# Patient Record
Sex: MALE | Race: WHITE | NOT HISPANIC OR LATINO | URBAN - METROPOLITAN AREA
[De-identification: names, ages, dates, MRNs, and addresses within clinical notes are randomized per-mention and may not be internally consistent; named-entity substitution may affect disease eponyms.]

---

## 2022-07-09 ENCOUNTER — TELEPHONE ENCOUNTER (OUTPATIENT)
Dept: URBAN - METROPOLITAN AREA CLINIC 121 | Facility: CLINIC | Age: 47
End: 2022-07-09

## 2022-07-09 RX ORDER — DEXLANSOPRAZOLE 60 MG/1
CAPSULE, DELAYED RELEASE ORAL ONCE A DAY
Refills: 11 | OUTPATIENT
Start: 2011-06-02 | End: 2011-06-14

## 2022-07-10 ENCOUNTER — TELEPHONE ENCOUNTER (OUTPATIENT)
Dept: URBAN - METROPOLITAN AREA CLINIC 121 | Facility: CLINIC | Age: 47
End: 2022-07-10

## 2022-07-10 RX ORDER — PANTOPRAZOLE SODIUM 40 MG/1
TABLET, DELAYED RELEASE ORAL ONCE A DAY
Refills: 11 | Status: ACTIVE | COMMUNITY
Start: 2011-06-07

## 2022-07-10 RX ORDER — PANTOPRAZOLE SODIUM 40 MG/1
TAKE 1 TABLET BY MOUTH ONCE A DAY TABLET, DELAYED RELEASE ORAL
Refills: 0 | Status: ACTIVE | COMMUNITY
Start: 2012-07-09

## 2022-07-10 RX ORDER — FLUCONAZOLE 100 MG/1
TABLET ORAL ONCE A DAY
Refills: 0 | Status: ACTIVE | COMMUNITY
Start: 2011-06-02

## 2022-07-10 RX ORDER — OXYMETAZOLINE HCL 0.05 %
SPRAY, NON-AEROSOL (ML) NASAL
Refills: 0 | Status: ACTIVE | COMMUNITY
Start: 2011-05-24

## 2022-07-10 RX ORDER — OMEGA-3S/DHA/EPA/FISH OIL 980-1400MG
CAPSULE,DELAYED RELEASE (ENTERIC COATED) ORAL
Refills: 0 | Status: ACTIVE | COMMUNITY
Start: 2011-05-24

## 2022-07-10 RX ORDER — DEXLANSOPRAZOLE 60 MG/1
CAPSULE, DELAYED RELEASE ORAL ONCE A DAY
Refills: 0 | Status: ACTIVE | COMMUNITY
Start: 2011-06-02

## 2022-07-10 RX ORDER — RABEPRAZOLE SODIUM 20 MG/1
TABLET, DELAYED RELEASE ORAL ONCE A DAY
Refills: 0 | Status: ACTIVE | COMMUNITY
Start: 2011-07-08

## 2023-07-12 ENCOUNTER — OFFICE VISIT (OUTPATIENT)
Dept: URBAN - METROPOLITAN AREA CLINIC 68 | Facility: CLINIC | Age: 48
End: 2023-07-12
Payer: COMMERCIAL

## 2023-07-12 ENCOUNTER — WEB ENCOUNTER (OUTPATIENT)
Dept: URBAN - METROPOLITAN AREA CLINIC 68 | Facility: CLINIC | Age: 48
End: 2023-07-12

## 2023-07-12 VITALS
HEIGHT: 68 IN | DIASTOLIC BLOOD PRESSURE: 70 MMHG | BODY MASS INDEX: 23.04 KG/M2 | WEIGHT: 152 LBS | SYSTOLIC BLOOD PRESSURE: 120 MMHG

## 2023-07-12 DIAGNOSIS — K22.70 BARRETT'S ESOPHAGUS WITHOUT DYSPLASIA: ICD-10-CM

## 2023-07-12 DIAGNOSIS — K52.9 CHRONIC DIARRHEA: ICD-10-CM

## 2023-07-12 DIAGNOSIS — K51.30 ULCERATIVE RECTOSIGMOIDITIS WITHOUT COMPLICATION: ICD-10-CM

## 2023-07-12 DIAGNOSIS — R19.7 DIARRHEA, UNSPECIFIED TYPE: ICD-10-CM

## 2023-07-12 PROBLEM — 41364008: Status: ACTIVE | Noted: 2023-07-12

## 2023-07-12 PROCEDURE — 99205 OFFICE O/P NEW HI 60 MIN: CPT | Performed by: SPECIALIST

## 2023-07-12 RX ORDER — OMEPRAZOLE 20 MG/1
1 CAPSULE 30 MINUTES BEFORE MORNING MEAL CAPSULE, DELAYED RELEASE ORAL ONCE A DAY
Status: ACTIVE | COMMUNITY

## 2023-07-12 RX ORDER — INSULIN LISPRO-AABC 100 [IU]/ML
AS DIRECTED INJECTION, SOLUTION INTRAVENOUS; SUBCUTANEOUS
Status: ACTIVE | COMMUNITY

## 2023-07-12 RX ORDER — OMEPRAZOLE 20 MG/1: 1 CAPSULE 30 MINUTES BEFORE MORNING MEAL CAPSULE, DELAYED RELEASE ORAL ONCE A DAY

## 2023-07-12 RX ORDER — INFLIXIMAB 100 MG/10ML
AS DIRECTED INJECTION, POWDER, LYOPHILIZED, FOR SOLUTION INTRAVENOUS
Qty: 1 EACH | Refills: 3 | OUTPATIENT
Start: 2023-07-19 | End: 2024-07-13

## 2023-07-12 NOTE — HPI-TODAY'S VISIT:
This 47-year-old with history of ulcerative colitis since 2011.  He was treated by [facility] Dr. Naeem Woods.  In the past he was on oral agents zemposia  with partial relief however with his flares it was decided to start on biologic therapy infusion type and was chosen to start on Stelara.  Approximate 10 weeks ago he got Stelara dose which improved him somewhat but he quickly relapsed with multiple loose stools and blood in his stool. Dr. Naeem Antunez evaluated and recommended infliximab and prednisone therapy he is on prednisone therapy with plan to start Remicade.  Currently is on 30 mg a day prednisone improved and dropping to 20 mg a day.  Currently is not having any bleeding In the past has a history of Castro's esophagus noted with intestinal metaplasia early in 2011 and 14 most recently 2019 upper endoscopy revealed no definite Castro's esophagus. Last endoscopy January 2023 revealed definite colitis Melo score 3

## 2023-08-07 ENCOUNTER — LAB OUTSIDE AN ENCOUNTER (OUTPATIENT)
Dept: URBAN - METROPOLITAN AREA CLINIC 68 | Facility: CLINIC | Age: 48
End: 2023-08-07

## 2023-08-08 LAB
ABSOLUTE BASOPHILS: 68
ABSOLUTE EOSINOPHILS: 90
ABSOLUTE LYMPHOCYTES: 2670
ABSOLUTE MONOCYTES: 548
ABSOLUTE NEUTROPHILS: 4125
BASOPHILS: 0.9
EOSINOPHILS: 1.2
HEMATOCRIT: 42.5
HEMOGLOBIN: 14
LYMPHOCYTES: 35.6
MCH: 28.9
MCHC: 32.9
MCV: 87.6
MONOCYTES: 7.3
MPV: 10.3
NEUTROPHILS: 55
PLATELET COUNT: 343
RDW: 15.2
RED BLOOD CELL COUNT: 4.85
WHITE BLOOD CELL COUNT: 7.5

## 2023-08-10 ENCOUNTER — OFFICE VISIT (OUTPATIENT)
Dept: URBAN - METROPOLITAN AREA CLINIC 68 | Facility: CLINIC | Age: 48
End: 2023-08-10
Payer: COMMERCIAL

## 2023-08-10 ENCOUNTER — TELEPHONE ENCOUNTER (OUTPATIENT)
Dept: URBAN - METROPOLITAN AREA CLINIC 68 | Facility: CLINIC | Age: 48
End: 2023-08-10

## 2023-08-10 VITALS — HEIGHT: 68 IN

## 2023-08-10 DIAGNOSIS — K51.30 ULCERATIVE RECTOSIGMOIDITIS WITHOUT COMPLICATION: ICD-10-CM

## 2023-08-10 DIAGNOSIS — K22.70 BARRETT'S ESOPHAGUS WITHOUT DYSPLASIA: ICD-10-CM

## 2023-08-10 PROCEDURE — 99214 OFFICE O/P EST MOD 30 MIN: CPT | Performed by: SPECIALIST

## 2023-08-10 RX ORDER — INFLIXIMAB 100 MG/10ML
AS DIRECTED INJECTION, POWDER, LYOPHILIZED, FOR SOLUTION INTRAVENOUS
Qty: 1 EACH | Refills: 3
Start: 2023-07-19 | End: 2024-08-04

## 2023-08-10 RX ORDER — PREDNISONE 5 MG/1
3 TABLETS TABLET ORAL ONCE A DAY
OUTPATIENT

## 2023-08-10 RX ORDER — INSULIN LISPRO-AABC 100 [IU]/ML
AS DIRECTED INJECTION, SOLUTION INTRAVENOUS; SUBCUTANEOUS
Status: ACTIVE | COMMUNITY

## 2023-08-10 RX ORDER — OMEPRAZOLE 20 MG/1
1 CAPSULE 30 MINUTES BEFORE MORNING MEAL CAPSULE, DELAYED RELEASE ORAL ONCE A DAY
Status: ACTIVE | COMMUNITY

## 2023-08-10 RX ORDER — INFLIXIMAB 100 MG/10ML
AS DIRECTED INJECTION, POWDER, LYOPHILIZED, FOR SOLUTION INTRAVENOUS
Qty: 1 EACH | Refills: 3
Start: 2023-07-19 | End: 2024-08-05

## 2023-08-10 RX ORDER — INFLIXIMAB 100 MG/10ML
AS DIRECTED INJECTION, POWDER, LYOPHILIZED, FOR SOLUTION INTRAVENOUS
Qty: 1 EACH | Refills: 3 | Status: ON HOLD | COMMUNITY
Start: 2023-07-19 | End: 2024-07-13

## 2023-08-10 RX ORDER — PREDNISONE 5 MG/1
3 TABLETS TABLET ORAL ONCE A DAY
Status: ACTIVE | COMMUNITY

## 2023-08-10 NOTE — HPI-TODAY'S VISIT:
Patient is doing well without any symptoms of colitis bleeding or urgency on a dose of prednisone 5 mg a day. His follow-up eosinophil levels have normalized. His complex history includes a upper respiratory-like infection symptoms after Stelara infusion for induction In the past the patient has had an apparent allergic reaction to mesalamine with development of hives and eosinophilia as well as a reaction to symposia therapy Patient is cleared for Remicade therapy as recommended by Dr. Galdamez of [facility] however he is hesitant given his reaction to previous biologic therapy  Impression Ulcerative colitis in clinical remission on steroids Eosinophilia resolved Pending allergy consultation  Recommendation Wean off prednisone in the next 2 to 4 weeks Start Remicade therapy in view of his recurrent colitis and recurrent need for prednisone in the past as well as his active pattern on his last colonoscopy and elevated calprotectin level Pgt ois reluctant and wants to wean off steroids again Avoid mesalamine apparent allergic reaction  await allergy consultation recommendations could be trialed again  Detailed discussion of risks and benefits of above plan with the patient he is concerned and will defer starting biologic until further discussion with Dr. Galdamez including the option of Entyvio therapy as opposed to Remicade awaiting professional recommendation

## 2023-08-11 PROBLEM — 302914006: Status: ACTIVE | Noted: 2023-07-19

## 2023-08-14 ENCOUNTER — TELEPHONE ENCOUNTER (OUTPATIENT)
Dept: URBAN - METROPOLITAN AREA CLINIC 68 | Facility: CLINIC | Age: 48
End: 2023-08-14

## 2023-08-14 RX ORDER — OMEPRAZOLE 20 MG/1
1 CAPSULE 30 MINUTES BEFORE MORNING MEAL CAPSULE, DELAYED RELEASE ORAL ONCE A DAY
Status: ACTIVE | COMMUNITY

## 2023-08-14 RX ORDER — PREDNISONE 5 MG/1
3 TABLETS TABLET ORAL ONCE A DAY
Status: ACTIVE | COMMUNITY

## 2023-08-14 RX ORDER — INSULIN LISPRO-AABC 100 [IU]/ML
AS DIRECTED INJECTION, SOLUTION INTRAVENOUS; SUBCUTANEOUS
Status: ACTIVE | COMMUNITY

## 2023-08-14 RX ORDER — INFLIXIMAB 100 MG/10ML
AS DIRECTED INJECTION, POWDER, LYOPHILIZED, FOR SOLUTION INTRAVENOUS
Qty: 1 EACH | Refills: 3 | Status: ACTIVE | COMMUNITY
Start: 2023-07-19 | End: 2024-08-04

## 2023-09-21 ENCOUNTER — TELEPHONE ENCOUNTER (OUTPATIENT)
Dept: URBAN - METROPOLITAN AREA CLINIC 68 | Facility: CLINIC | Age: 48
End: 2023-09-21

## 2023-09-27 ENCOUNTER — OFFICE VISIT (OUTPATIENT)
Dept: URBAN - METROPOLITAN AREA TELEHEALTH 1 | Facility: TELEHEALTH | Age: 48
End: 2023-09-27
Payer: COMMERCIAL

## 2023-09-27 DIAGNOSIS — K51.30 ULCERATIVE RECTOSIGMOIDITIS WITHOUT COMPLICATION: ICD-10-CM

## 2023-09-27 PROCEDURE — 99443 PHONE E/M BY PHYS 21-30 MIN: CPT | Performed by: SPECIALIST

## 2023-09-27 RX ORDER — PREDNISONE 5 MG/1
3 TABLETS TABLET ORAL ONCE A DAY
Status: ACTIVE | COMMUNITY

## 2023-09-27 RX ORDER — OMEPRAZOLE 20 MG/1
1 CAPSULE 30 MINUTES BEFORE MORNING MEAL CAPSULE, DELAYED RELEASE ORAL ONCE A DAY
Status: ACTIVE | COMMUNITY

## 2023-09-27 RX ORDER — PREDNISONE 5 MG/1
4 TABLTETS TABLET ORAL ONCE A DAY
Qty: 120 TABLET | Refills: 3

## 2023-09-27 RX ORDER — INSULIN LISPRO-AABC 100 [IU]/ML
AS DIRECTED INJECTION, SOLUTION INTRAVENOUS; SUBCUTANEOUS
Status: ACTIVE | COMMUNITY

## 2023-09-27 RX ORDER — INFLIXIMAB 100 MG/10ML
AS DIRECTED INJECTION, POWDER, LYOPHILIZED, FOR SOLUTION INTRAVENOUS
Qty: 1 EACH | Refills: 3 | Status: ACTIVE | COMMUNITY
Start: 2023-07-19 | End: 2024-08-04

## 2023-09-27 NOTE — HPI-TODAY'S VISIT:
TELEMED  Patient has symptoms of diarrhea and rectal bleeding. He's running with history inflammatory bowel disease. Inflection of what is denied by insurance company however bio equipment was approved. He's had arrangements to start therapy within one week at [facility]. He is currently taking prednisone 10 mg a day with worsening symptoms on the lower dose He'll continue prednisone until then I'm on the way IMPRESSION Active ulcerative colitis Pending restart biologic therapy with infliximab bioequivalent Somewhat controlled on prednisone will need higher dose PLAN Increase prednisone 20 mg daily start infliximab therapy

## 2023-10-26 ENCOUNTER — TELEPHONE ENCOUNTER (OUTPATIENT)
Dept: URBAN - METROPOLITAN AREA CLINIC 68 | Facility: CLINIC | Age: 48
End: 2023-10-26

## 2023-10-31 ENCOUNTER — TELEPHONE ENCOUNTER (OUTPATIENT)
Dept: URBAN - METROPOLITAN AREA CLINIC 68 | Facility: CLINIC | Age: 48
End: 2023-10-31

## 2023-10-31 ENCOUNTER — OFFICE VISIT (OUTPATIENT)
Dept: URBAN - METROPOLITAN AREA TELEHEALTH 1 | Facility: TELEHEALTH | Age: 48
End: 2023-10-31
Payer: COMMERCIAL

## 2023-10-31 VITALS — HEIGHT: 68 IN

## 2023-10-31 DIAGNOSIS — K22.70 BARRETT'S ESOPHAGUS WITHOUT DYSPLASIA: ICD-10-CM

## 2023-10-31 DIAGNOSIS — K51.30 ULCERATIVE RECTOSIGMOIDITIS WITHOUT COMPLICATION: ICD-10-CM

## 2023-10-31 PROCEDURE — 99443 PHONE E/M BY PHYS 21-30 MIN: CPT | Performed by: SPECIALIST

## 2023-10-31 RX ORDER — INSULIN LISPRO-AABC 100 [IU]/ML
AS DIRECTED INJECTION, SOLUTION INTRAVENOUS; SUBCUTANEOUS
Status: ACTIVE | COMMUNITY

## 2023-10-31 RX ORDER — INFLIXIMAB 100 MG/10ML
AS DIRECTED INJECTION, POWDER, LYOPHILIZED, FOR SOLUTION INTRAVENOUS
Qty: 1 EACH | Refills: 3 | Status: ACTIVE | COMMUNITY
Start: 2023-07-19 | End: 2024-08-04

## 2023-10-31 RX ORDER — PREDNISONE 5 MG/1
4 TABLTETS TABLET ORAL ONCE A DAY
Qty: 120 TABLET | Refills: 3 | Status: ACTIVE | COMMUNITY

## 2023-10-31 RX ORDER — PREDNISONE 5 MG/1
2 TABLETS ONCE A DAY FOR 14 DAYS, 1 TABLET ONCE A DAY FOR 14 DAYS TABLET ORAL
Qty: 42 TABLET | OUTPATIENT

## 2023-10-31 RX ORDER — OMEPRAZOLE 20 MG/1
1 CAPSULE 30 MINUTES BEFORE MORNING MEAL CAPSULE, DELAYED RELEASE ORAL ONCE A DAY
Status: ACTIVE | COMMUNITY

## 2023-10-31 RX ORDER — MESALAMINE 1000 MG/1
1 SUPPOSITORY AT BEDTIME SUPPOSITORY RECTAL ONCE A DAY
Qty: 30 | OUTPATIENT
Start: 2023-10-31 | End: 2023-11-29

## 2023-10-31 NOTE — HPI-TODAY'S VISIT:
Patient has a history of colon polyps in the past, Patient denies any active symptoms of rectal bleeding or signficant change in BM pattern, and no other alarm symptoms            Pt denies abdominal pain or Upper GI symptoms.[id number]

## 2023-10-31 NOTE — HPI-TODAY'S VISIT:
TeleMed visitPatient noticed some blood in the stool last 2 days however stools are formed and not frequent or diarrhea patient blames marrow stress over the weekend no abdominal pain no nausea vomiting or fever Patient has known ulcerative colitis and improved on second dose of infliximab infusion therapy has reduced prednisone to 10 mg daily IMPRESSIONUlcerative colitisClinical remission on infliximab therapy weaning off of prednisonePossible research protocol candidate patient will deferPossible flare more likely anorectal irritation with formed bowel movements PLANStart Canasa suppositories if's bleeding persistsWean off prednisone over 4 weeksSurveillance blood work mid NovemberFollow-up office in 6 weeks earlier if flare occursConsider research protocol therapy if patient symptoms flare

## 2023-11-16 ENCOUNTER — LAB OUTSIDE AN ENCOUNTER (OUTPATIENT)
Dept: URBAN - METROPOLITAN AREA CLINIC 68 | Facility: CLINIC | Age: 48
End: 2023-11-16

## 2023-11-17 LAB
ABSOLUTE BASOPHILS: 97
ABSOLUTE EOSINOPHILS: 290
ABSOLUTE LYMPHOCYTES: 2622
ABSOLUTE MONOCYTES: 559
ABSOLUTE NEUTROPHILS: 3333
ALBUMIN/GLOBULIN RATIO: 1.7
ALBUMIN: 4.3
ALKALINE PHOSPHATASE: 43
ALT (SGPT): 12
AST (SGOT): 14
BASOPHILS: 1.4
BILIRUBIN, DIRECT: 0.1
BILIRUBIN, INDIRECT: 0.6
BILIRUBIN, TOTAL: 0.7
EOSINOPHILS: 4.2
GLOBULIN: 2.5
HEMATOCRIT: 45.8
HEMOGLOBIN: 15.3
LYMPHOCYTES: 38
MCH: 30.9
MCHC: 33.4
MCV: 92.5
MONOCYTES: 8.1
MPV: 10.5
NEUTROPHILS: 48.3
PLATELET COUNT: 376
PROTEIN, TOTAL: 6.8
RDW: 12.5
RED BLOOD CELL COUNT: 4.95
WHITE BLOOD CELL COUNT: 6.9

## 2023-11-21 ENCOUNTER — OFFICE VISIT (OUTPATIENT)
Dept: URBAN - METROPOLITAN AREA CLINIC 68 | Facility: CLINIC | Age: 48
End: 2023-11-21
Payer: COMMERCIAL

## 2023-11-21 VITALS
BODY MASS INDEX: 24.1 KG/M2 | HEIGHT: 68 IN | RESPIRATION RATE: 18 BRPM | SYSTOLIC BLOOD PRESSURE: 126 MMHG | HEART RATE: 87 BPM | DIASTOLIC BLOOD PRESSURE: 80 MMHG | TEMPERATURE: 97.7 F | OXYGEN SATURATION: 97 % | WEIGHT: 159 LBS

## 2023-11-21 DIAGNOSIS — K22.70 BARRETT'S ESOPHAGUS WITHOUT DYSPLASIA: ICD-10-CM

## 2023-11-21 DIAGNOSIS — K51.30 ULCERATIVE RECTOSIGMOIDITIS WITHOUT COMPLICATION: ICD-10-CM

## 2023-11-21 PROCEDURE — 99214 OFFICE O/P EST MOD 30 MIN: CPT | Performed by: SPECIALIST

## 2023-11-21 RX ORDER — MESALAMINE 1000 MG/1
1 SUPPOSITORY AT BEDTIME SUPPOSITORY RECTAL ONCE A DAY
Qty: 30 | OUTPATIENT

## 2023-11-21 RX ORDER — PREDNISONE 5 MG/1
2 TABLETS ONCE A DAY FOR 14 DAYS, 1 TABLET ONCE A DAY FOR 14 DAYS TABLET ORAL
Qty: 42 TABLET | Status: ACTIVE | COMMUNITY

## 2023-11-21 RX ORDER — OMEPRAZOLE 20 MG/1
1 CAPSULE 30 MINUTES BEFORE MORNING MEAL CAPSULE, DELAYED RELEASE ORAL ONCE A DAY
Status: ACTIVE | COMMUNITY

## 2023-11-21 RX ORDER — PREDNISONE 5 MG/1
2 TABLETS ONCE A DAY FOR 14 DAYS, 1 TABLET ONCE A DAY FOR 14 DAYS TABLET ORAL
Qty: 42 TABLET | OUTPATIENT

## 2023-11-21 RX ORDER — INFLIXIMAB 100 MG/10ML
AS DIRECTED INJECTION, POWDER, LYOPHILIZED, FOR SOLUTION INTRAVENOUS
Qty: 1 EACH | Refills: 3 | Status: ACTIVE | COMMUNITY
Start: 2023-07-19 | End: 2024-08-04

## 2023-11-21 RX ORDER — MESALAMINE 1000 MG/1
1 SUPPOSITORY AT BEDTIME SUPPOSITORY RECTAL ONCE A DAY
Qty: 30 | Status: ACTIVE | COMMUNITY
Start: 2023-10-31 | End: 2023-11-29

## 2023-11-21 RX ORDER — INSULIN LISPRO-AABC 100 [IU]/ML
AS DIRECTED INJECTION, SOLUTION INTRAVENOUS; SUBCUTANEOUS
Status: ACTIVE | COMMUNITY

## 2023-11-21 NOTE — HPI-TODAY'S VISIT:
11/21 Rarely symptoms of constipation now once or two BMs regular  No bleeding definite improved on Inflixamiab  predinsone is 5mg daily and pt is stopping today  Never needed canasa  Tolerating infusions well   Labs are good   PLAN , cont infulsions Canasa for bleeding Mirialx can be  used constipation  Continue Omeprazole  FU labs 3 months       PRIOR  TeleMed visitPatient noticed some blood in the stool last 2 days however stools are formed and not frequent or diarrhea patient blames marrow stress over the weekend no abdominal pain no nausea vomiting or fever Patient has known ulcerative colitis and improved on second dose of infliximab infusion therapy has reduced prednisone to 10 mg daily IMPRESSIONUlcerative colitisClinical remission on infliximab therapy weaning off of prednisonePossible research protocol candidate patient will deferPossible flare more likely anorectal irritation with formed bowel movements PLANStart Canasa suppositories if's bleeding persistsWean off prednisone over 4 weeksSurveillance blood work mid NovemberFollow-up office in 6 weeks earlier if flare occursConsider research protocol therapy if patient symptoms flare

## 2023-11-21 NOTE — PHYSICAL EXAM CHEST:
Please print the  [facility]-16 or [facility]-16B incomplete scan   out of the Med Info Encounter or  Tab. Please see the cover sheet for return instructions. Thank you   no lesions, no deformities, no traumatic injuries, no significant scars are present, chest wall non-tender, no masses present, breathing is unlabored without accessory muscle use,normal breath sounds

## 2024-01-29 ENCOUNTER — TELEPHONE ENCOUNTER (OUTPATIENT)
Dept: URBAN - METROPOLITAN AREA CLINIC 68 | Facility: CLINIC | Age: 49
End: 2024-01-29

## 2024-02-02 ENCOUNTER — OV EP (OUTPATIENT)
Dept: URBAN - METROPOLITAN AREA CLINIC 68 | Facility: CLINIC | Age: 49
End: 2024-02-02
Payer: COMMERCIAL

## 2024-02-02 VITALS
DIASTOLIC BLOOD PRESSURE: 78 MMHG | BODY MASS INDEX: 24.1 KG/M2 | SYSTOLIC BLOOD PRESSURE: 124 MMHG | HEIGHT: 68 IN | WEIGHT: 159 LBS

## 2024-02-02 DIAGNOSIS — R19.7 DIARRHEA, UNSPECIFIED TYPE: ICD-10-CM

## 2024-02-02 DIAGNOSIS — K51.30 ULCERATIVE RECTOSIGMOIDITIS WITHOUT COMPLICATION: ICD-10-CM

## 2024-02-02 DIAGNOSIS — J45.909: ICD-10-CM

## 2024-02-02 DIAGNOSIS — E10.65: ICD-10-CM

## 2024-02-02 PROCEDURE — 99215 OFFICE O/P EST HI 40 MIN: CPT | Performed by: SPECIALIST

## 2024-02-02 RX ORDER — PREDNISONE 10 MG/1
2 TABLETS TABLET ORAL ONCE A DAY
Qty: 60 TABLET | Refills: 1 | OUTPATIENT
Start: 2024-02-02 | End: 2024-04-02

## 2024-02-02 RX ORDER — MESALAMINE 1000 MG/1
1 SUPPOSITORY AT BEDTIME SUPPOSITORY RECTAL ONCE A DAY
Qty: 30 | Status: ACTIVE | COMMUNITY

## 2024-02-02 RX ORDER — OMEPRAZOLE 20 MG/1
1 CAPSULE 30 MINUTES BEFORE MORNING MEAL CAPSULE, DELAYED RELEASE ORAL ONCE A DAY
Status: ACTIVE | COMMUNITY

## 2024-02-02 RX ORDER — INFLIXIMAB 100 MG/10ML
AS DIRECTED INJECTION, POWDER, LYOPHILIZED, FOR SOLUTION INTRAVENOUS
Qty: 1 EACH | Refills: 3 | Status: ACTIVE | COMMUNITY
Start: 2023-07-19 | End: 2024-08-04

## 2024-02-02 RX ORDER — INSULIN LISPRO-AABC 100 [IU]/ML
AS DIRECTED INJECTION, SOLUTION INTRAVENOUS; SUBCUTANEOUS
Status: ACTIVE | COMMUNITY

## 2024-02-02 NOTE — HPI-TODAY'S VISIT:
2/2  Episodes of SOB and hypoxia two weeks after off prednisone Complete eval 4 days  CT chest, echo, all normal except Bronchitis, diagnosis is bronchitis  no antibiotics  Symbicort now daily Saw DR Galdamez December  Saw pulm diagnosis asthma ? ? Eosinophilic  causes ?  Now two BMs daily, some mixed with mucous and blood  Infliximab levels are not theraputic need to be  higher to be efecrtive   Discussed case in detail with IBD specialist, [facility], Dr Naeem Galdamez, he agrees with plan to increase to Inflix 10 mg / kg every 6 weeks to allow DC prednisone and avoid prednisone risk and to control active colitis  IMPRESSION  Inadequate serum levels of infliximab and ongoing clinically active colitis, needs increased dose and frequency of Infliximab   Colitis flare, needs low dose prednisone, and increase Infliximab    PLAN Prednisone 20 daily   increase to 10 mg,, Q 6 weeks  recheck levels then  11/21 Rarely symptoms of constipation now once or two BMs regular  No bleeding definite improved on Infliximab  prednisone is 5mg daily and pt is stopping today  Never needed Canasa  Tolerating infusions well   Labs are good   PLAN, cont infusions Canasa for bleeding MiraLAX can be  used constipation  Continue Omeprazole  FU labs 3 months       PRIOR  TeleMed visit  Patient noticed some blood in the stool last 2 days however stools are formed and not frequent or diarrhea patient blames marrow stress over the weekend no abdominal pain no nausea vomiting or fever Patient has known ulcerative colitis and improved on second dose of infliximab infusion therapy has reduced prednisone to 10 mg daily IMPRESSION Ulcerative colitis Clinical remission on infliximab therapy weaning off of prednisone Possible research protocol candidate patient will defer Possible flare more likely anorectal irritation with formed bowel movements  PLAN Start Canasa suppositories if's bleeding persists Wean off prednisone over 4 weeks Surveillance blood work mid November Follow-up office in 6 weeks earlier if flare occurs Consider research protocol therapy if patient symptoms flare

## 2024-02-21 ENCOUNTER — OV EP (OUTPATIENT)
Dept: URBAN - METROPOLITAN AREA CLINIC 68 | Facility: CLINIC | Age: 49
End: 2024-02-21

## 2024-02-21 VITALS
HEIGHT: 68 IN | WEIGHT: 165 LBS | OXYGEN SATURATION: 99 % | BODY MASS INDEX: 25.01 KG/M2 | HEART RATE: 58 BPM | DIASTOLIC BLOOD PRESSURE: 80 MMHG | SYSTOLIC BLOOD PRESSURE: 138 MMHG

## 2024-02-21 RX ORDER — PREDNISONE 10 MG/1
2 TABLETS TABLET ORAL ONCE A DAY
Qty: 60 TABLET | Refills: 1 | Status: ACTIVE | COMMUNITY
Start: 2024-02-02 | End: 2024-04-02

## 2024-02-21 RX ORDER — INSULIN LISPRO-AABC 100 [IU]/ML
AS DIRECTED INJECTION, SOLUTION INTRAVENOUS; SUBCUTANEOUS
Status: ACTIVE | COMMUNITY

## 2024-02-21 RX ORDER — INFLIXIMAB 100 MG/10ML
AS DIRECTED INJECTION, POWDER, LYOPHILIZED, FOR SOLUTION INTRAVENOUS
Qty: 1 EACH | Refills: 3 | Status: ACTIVE | COMMUNITY
Start: 2023-07-19 | End: 2024-08-04

## 2024-02-21 RX ORDER — MESALAMINE 1000 MG/1
1 SUPPOSITORY AT BEDTIME SUPPOSITORY RECTAL ONCE A DAY
Qty: 30 | Status: ON HOLD | COMMUNITY

## 2024-02-21 RX ORDER — OMEPRAZOLE 20 MG/1
1 CAPSULE 30 MINUTES BEFORE MORNING MEAL CAPSULE, DELAYED RELEASE ORAL ONCE A DAY
Status: ACTIVE | COMMUNITY

## 2024-02-21 RX ORDER — PREDNISONE 10 MG/1
4 TABLETS TABLET ORAL ONCE A DAY
Qty: 120 TABLET | Refills: 1
Start: 2024-02-02 | End: 2024-04-21

## 2024-02-21 NOTE — HPI-TODAY'S VISIT:
89 still w multiple BMs and blood  occas nocturnal BMs , once , then 2 Bms in the AM then stef for the rest of the day wiill get 10 mg dose next week  Calprotectin is elevated and Cdif is negative  No sob or fepirartory component Need to increase to 40 mg pred daily Staging colon to be considered       2/2  Episodes of SOB and hypoxia two weeks after off prednisone Complete eval 4 days  CT chest, echo, all normal except Bronchitis, diagnosis is bronchitis  no antibiotics  Symbicort now daily Saw DR Galdamez December  Saw pulm diagnosis asthma ? ? Eosinophilic  causes ?  Now two BMs daily, some mixed with mucous and blood  Infliximab levels are not theraputic need to be  higher to be efecrtive   Discussed case in detail with IBD specialist, [facility], Dr Naeem Galdamez, he agrees with plan to increase to Inflix 10 mg / kg every 6 weeks to allow DC prednisone and avoid prednisone risk and to control active colitis  IMPRESSION  Inadequate serum levels of infliximab and ongoing clinically active colitis, needs increased dose and frequency of Infliximab   Colitis flare, needs low dose prednisone, and increase Infliximab    PLAN Prednisone 20 daily   increase to 10 mg,, Q 6 weeks  recheck levels then  11/21 Rarely symptoms of constipation now once or two BMs regular  No bleeding definite improved on Infliximab  prednisone is 5mg daily and pt is stopping today  Never needed Canasa  Tolerating infusions well   Labs are good   PLAN, cont infusions Canasa for bleeding MiraLAX can be  used constipation  Continue Omeprazole  FU labs 3 months       PRIOR  TeleMed visit  Patient noticed some blood in the stool last 2 days however stools are formed and not frequent or diarrhea patient blames marrow stress over the weekend no abdominal pain no nausea vomiting or fever Patient has known ulcerative colitis and improved on second dose of infliximab infusion therapy has reduced prednisone to 10 mg daily IMPRESSION Ulcerative colitis Clinical remission on infliximab therapy weaning off of prednisone Possible research protocol candidate patient will defer Possible flare more likely anorectal irritation with formed bowel movements  PLAN Start Canasa suppositories if's bleeding persists Wean off prednisone over 4 weeks Surveillance blood work mid November Follow-up office in 6 weeks earlier if flare occurs Consider research protocol therapy if patient symptoms flare

## 2024-05-01 ENCOUNTER — OFFICE VISIT (OUTPATIENT)
Dept: URBAN - METROPOLITAN AREA TELEHEALTH 1 | Facility: TELEHEALTH | Age: 49
End: 2024-05-01
Payer: COMMERCIAL

## 2024-05-01 ENCOUNTER — DASHBOARD ENCOUNTERS (OUTPATIENT)
Age: 49
End: 2024-05-01

## 2024-05-01 VITALS — HEIGHT: 68 IN

## 2024-05-01 DIAGNOSIS — J45.909: ICD-10-CM

## 2024-05-01 DIAGNOSIS — K22.70 BARRETT'S ESOPHAGUS WITHOUT DYSPLASIA: ICD-10-CM

## 2024-05-01 DIAGNOSIS — K51.30 ULCERATIVE RECTOSIGMOIDITIS WITHOUT COMPLICATION: ICD-10-CM

## 2024-05-01 DIAGNOSIS — E10.65: ICD-10-CM

## 2024-05-01 PROCEDURE — 99443 PHONE E/M BY PHYS 21-30 MIN: CPT | Performed by: SPECIALIST

## 2024-05-01 RX ORDER — INSULIN LISPRO-AABC 100 [IU]/ML
AS DIRECTED INJECTION, SOLUTION INTRAVENOUS; SUBCUTANEOUS
Status: ACTIVE | COMMUNITY

## 2024-05-01 RX ORDER — OMEPRAZOLE 20 MG/1
1 CAPSULE 30 MINUTES BEFORE MORNING MEAL CAPSULE, DELAYED RELEASE ORAL ONCE A DAY
OUTPATIENT

## 2024-05-01 RX ORDER — MESALAMINE 1000 MG/1
1 SUPPOSITORY AT BEDTIME SUPPOSITORY RECTAL ONCE A DAY
Qty: 30 | Status: ON HOLD | COMMUNITY

## 2024-05-01 RX ORDER — INFLIXIMAB 100 MG/10ML
AS DIRECTED INJECTION, POWDER, LYOPHILIZED, FOR SOLUTION INTRAVENOUS
Qty: 1 EACH | Refills: 3 | Status: ACTIVE | COMMUNITY
Start: 2023-07-19 | End: 2024-08-04

## 2024-05-01 RX ORDER — OMEPRAZOLE 20 MG/1
1 CAPSULE 30 MINUTES BEFORE MORNING MEAL CAPSULE, DELAYED RELEASE ORAL ONCE A DAY
Status: ACTIVE | COMMUNITY

## 2024-05-06 ENCOUNTER — TELEPHONE ENCOUNTER (OUTPATIENT)
Dept: URBAN - METROPOLITAN AREA CLINIC 68 | Facility: CLINIC | Age: 49
End: 2024-05-06

## 2024-05-13 ENCOUNTER — TELEPHONE ENCOUNTER (OUTPATIENT)
Dept: URBAN - METROPOLITAN AREA CLINIC 68 | Facility: CLINIC | Age: 49
End: 2024-05-13

## 2024-05-14 ENCOUNTER — TELEPHONE ENCOUNTER (OUTPATIENT)
Dept: URBAN - METROPOLITAN AREA CLINIC 68 | Facility: CLINIC | Age: 49
End: 2024-05-14

## 2024-05-16 ENCOUNTER — P2P PATIENT RECORD (OUTPATIENT)
Age: 49
End: 2024-05-16

## 2024-06-11 ENCOUNTER — TELEPHONE ENCOUNTER (OUTPATIENT)
Dept: URBAN - METROPOLITAN AREA CLINIC 68 | Facility: CLINIC | Age: 49
End: 2024-06-11

## 2024-06-18 ENCOUNTER — WEB ENCOUNTER (OUTPATIENT)
Dept: URBAN - METROPOLITAN AREA CLINIC 68 | Facility: CLINIC | Age: 49
End: 2024-06-18

## 2024-06-18 RX ORDER — PREDNISONE 5 MG/1
1 TABLET TABLET ORAL ONCE A DAY
Qty: 28 TABLET | Refills: 0

## 2024-07-08 ENCOUNTER — OFFICE VISIT (OUTPATIENT)
Dept: URBAN - METROPOLITAN AREA CLINIC 68 | Facility: CLINIC | Age: 49
End: 2024-07-08
Payer: COMMERCIAL

## 2024-07-08 VITALS
OXYGEN SATURATION: 100 % | WEIGHT: 165 LBS | HEART RATE: 54 BPM | BODY MASS INDEX: 25.01 KG/M2 | DIASTOLIC BLOOD PRESSURE: 80 MMHG | SYSTOLIC BLOOD PRESSURE: 138 MMHG | HEIGHT: 68 IN

## 2024-07-08 DIAGNOSIS — K22.70 BARRETT'S ESOPHAGUS WITHOUT DYSPLASIA: ICD-10-CM

## 2024-07-08 DIAGNOSIS — B36.9 FUNGAL DERMATITIS: ICD-10-CM

## 2024-07-08 DIAGNOSIS — E10.65: ICD-10-CM

## 2024-07-08 DIAGNOSIS — K51.30 ULCERATIVE RECTOSIGMOIDITIS WITHOUT COMPLICATION: ICD-10-CM

## 2024-07-08 DIAGNOSIS — J45.909: ICD-10-CM

## 2024-07-08 PROCEDURE — 99214 OFFICE O/P EST MOD 30 MIN: CPT | Performed by: SPECIALIST

## 2024-07-08 RX ORDER — INSULIN LISPRO-AABC 100 [IU]/ML
AS DIRECTED INJECTION, SOLUTION INTRAVENOUS; SUBCUTANEOUS
Status: ACTIVE | COMMUNITY

## 2024-07-08 RX ORDER — MESALAMINE 1000 MG/1
1 SUPPOSITORY AT BEDTIME SUPPOSITORY RECTAL ONCE A DAY
Qty: 30 | Status: ON HOLD | COMMUNITY

## 2024-07-08 RX ORDER — NYSTATIN 500000 [USP'U]/1
1 TABLET TABLET, FILM COATED ORAL 4 TIMES DAILY
Qty: 40 TABLET | Refills: 1 | OUTPATIENT
Start: 2024-07-08 | End: 2024-07-28

## 2024-07-08 RX ORDER — PREDNISONE 5 MG/1
1 TABLET TABLET ORAL ONCE A DAY
Qty: 28 TABLET | Refills: 0 | Status: ACTIVE | COMMUNITY

## 2024-07-08 RX ORDER — OMEPRAZOLE 20 MG/1
1 CAPSULE 30 MINUTES BEFORE MORNING MEAL CAPSULE, DELAYED RELEASE ORAL ONCE A DAY
Status: ACTIVE | COMMUNITY

## 2024-07-08 RX ORDER — OMEPRAZOLE 20 MG/1
1 CAPSULE 30 MINUTES BEFORE MORNING MEAL CAPSULE, DELAYED RELEASE ORAL ONCE A DAY
OUTPATIENT

## 2024-07-08 RX ORDER — INFLIXIMAB 100 MG/10ML
AS DIRECTED INJECTION, POWDER, LYOPHILIZED, FOR SOLUTION INTRAVENOUS
Qty: 1 EACH | Refills: 3 | Status: ON HOLD | COMMUNITY
Start: 2023-07-19 | End: 2024-08-04

## 2024-07-08 NOTE — HPI-TODAY'S VISIT:
7/8/24  Clinical remission still on low dose prednisone, needs to wean off completely  Definite improvement and remission on Q 4 week infliximab therapy     5/ 1 TELEVISIT  cough and congestion, given antibiotics 7 days all resolved  Now BM's are incontrol no diarrhea bleeding or pain  Prednisone was 20 now 15 mg daily   SP April 15 infusion Levels are low no antibodies noted  REC increase to q 4 week dosing inflixamab bioequivalent need insurance approval decrease pred to 10 mg then 5 mg after next dose ? Consider change to Remicade?   FU OV 4 weeks

## 2024-07-17 ENCOUNTER — WEB ENCOUNTER (OUTPATIENT)
Dept: URBAN - METROPOLITAN AREA CLINIC 68 | Facility: CLINIC | Age: 49
End: 2024-07-17

## 2024-07-17 RX ORDER — PREDNISONE 5 MG/1
1 TABLET TABLET ORAL ONCE A DAY
Qty: 28 TABLET | Refills: 0
End: 2024-08-14

## 2024-07-22 ENCOUNTER — P2P PATIENT RECORD (OUTPATIENT)
Age: 49
End: 2024-07-22

## 2024-07-29 ENCOUNTER — LAB OUTSIDE AN ENCOUNTER (OUTPATIENT)
Dept: URBAN - METROPOLITAN AREA CLINIC 68 | Facility: CLINIC | Age: 49
End: 2024-07-29

## 2024-07-30 LAB
ABSOLUTE BASOPHILS: 72
ABSOLUTE EOSINOPHILS: 288
ABSOLUTE LYMPHOCYTES: 2945
ABSOLUTE MONOCYTES: 662
ABSOLUTE NEUTROPHILS: 3233
ALBUMIN/GLOBULIN RATIO: 1.6
ALBUMIN: 4.1
ALKALINE PHOSPHATASE: 51
ALT (SGPT): 13
AST (SGOT): 16
BASOPHILS: 1
BILIRUBIN, DIRECT: 0.1
BILIRUBIN, INDIRECT: 0.5
BILIRUBIN, TOTAL: 0.6
EOSINOPHILS: 4
GLOBULIN: 2.6
HEMATOCRIT: 45.4
HEMOGLOBIN: 14.6
LYMPHOCYTES: 40.9
MCH: 30
MCHC: 32.2
MCV: 93.2
MONOCYTES: 9.2
MPV: 10.9
NEUTROPHILS: 44.9
PLATELET COUNT: 350
PROTEIN, TOTAL: 6.7
RDW: 13.9
RED BLOOD CELL COUNT: 4.87
WHITE BLOOD CELL COUNT: 7.2

## 2024-08-19 ENCOUNTER — TELEPHONE ENCOUNTER (OUTPATIENT)
Dept: URBAN - METROPOLITAN AREA CLINIC 68 | Facility: CLINIC | Age: 49
End: 2024-08-19

## 2024-08-19 ENCOUNTER — OFFICE VISIT (OUTPATIENT)
Dept: URBAN - METROPOLITAN AREA TELEHEALTH 1 | Facility: TELEHEALTH | Age: 49
End: 2024-08-19

## 2024-08-19 RX ORDER — MESALAMINE 1000 MG/1
1 SUPPOSITORY AT BEDTIME SUPPOSITORY RECTAL ONCE A DAY
Qty: 30 | Status: ON HOLD | COMMUNITY

## 2024-08-19 RX ORDER — INSULIN LISPRO-AABC 100 [IU]/ML
AS DIRECTED INJECTION, SOLUTION INTRAVENOUS; SUBCUTANEOUS
Status: ACTIVE | COMMUNITY

## 2024-08-19 RX ORDER — OMEPRAZOLE 20 MG/1
1 CAPSULE 30 MINUTES BEFORE MORNING MEAL CAPSULE, DELAYED RELEASE ORAL ONCE A DAY
Status: ACTIVE | COMMUNITY

## 2024-08-19 NOTE — HPI-TODAY'S VISIT:
8/19   SP visit  with Dr Galdamez  Had developed anal fisure , used cream and metamucil  Had some red blood , stopped metamucil Now feels constipation " although no skippped days, feels some urgency  Ointment is nifedipine and lido compound , zinc oxide  Off prednisone for three weeks and metamucil   ONe BM last week with mucous  SP  promoetheus levels , were high   Pt is concerned about chronic intermittant symptoms , feels frustrated at recurrent minor symptoms and   IMP  Anal fissure resolved  Feeling of constipation Bleeding from fissure IBD in remission on high dose frequent inflixamab        7/8/24  Clinical remission still on low dose prednisone, needs to wean off completely  Definite improvement and remission on Q 4 week infliximab therapy     5/ 1 TELEVISIT  cough and congestion, given antibiotics 7 days all resolved  Now BM's are incontrol no diarrhea bleeding or pain  Prednisone was 20 now 15 mg daily   SP April 15 infusion Levels are low no antibodies noted  REC increase to q 4 week dosing inflixamab bioequivalent need insurance approval decrease pred to 10 mg then 5 mg after next dose ? Consider change to Remicade?   FU OV 4 weeks 3

## 2024-10-07 ENCOUNTER — OFFICE VISIT (OUTPATIENT)
Dept: URBAN - METROPOLITAN AREA CLINIC 68 | Facility: CLINIC | Age: 49
End: 2024-10-07

## 2024-10-14 ENCOUNTER — TELEPHONE ENCOUNTER (OUTPATIENT)
Dept: URBAN - METROPOLITAN AREA CLINIC 68 | Facility: CLINIC | Age: 49
End: 2024-10-14

## 2024-10-14 ENCOUNTER — OFFICE VISIT (OUTPATIENT)
Dept: URBAN - METROPOLITAN AREA CLINIC 68 | Facility: CLINIC | Age: 49
End: 2024-10-14

## 2024-10-14 ENCOUNTER — OFFICE VISIT (OUTPATIENT)
Dept: URBAN - METROPOLITAN AREA TELEHEALTH 1 | Facility: TELEHEALTH | Age: 49
End: 2024-10-14
Payer: COMMERCIAL

## 2024-10-14 VITALS — WEIGHT: 165 LBS | BODY MASS INDEX: 25.01 KG/M2 | HEIGHT: 68 IN

## 2024-10-14 DIAGNOSIS — K22.70 BARRETT'S ESOPHAGUS WITHOUT DYSPLASIA: ICD-10-CM

## 2024-10-14 DIAGNOSIS — K51.30 ULCERATIVE RECTOSIGMOIDITIS WITHOUT COMPLICATION: ICD-10-CM

## 2024-10-14 PROCEDURE — 99214 OFFICE O/P EST MOD 30 MIN: CPT

## 2024-10-14 RX ORDER — MESALAMINE 1000 MG/1
1 SUPPOSITORY AT BEDTIME SUPPOSITORY RECTAL ONCE A DAY
Qty: 30 | Status: ON HOLD | COMMUNITY

## 2024-10-14 RX ORDER — PREDNISONE 10 MG/1
3 TABLETS TABLET ORAL ONCE A DAY
Qty: 90 TABLET | Refills: 0 | OUTPATIENT
Start: 2024-10-14 | End: 2024-11-12

## 2024-10-14 RX ORDER — INSULIN LISPRO-AABC 100 [IU]/ML
AS DIRECTED INJECTION, SOLUTION INTRAVENOUS; SUBCUTANEOUS
Status: ACTIVE | COMMUNITY

## 2024-10-14 RX ORDER — OMEPRAZOLE 20 MG/1
1 CAPSULE 30 MINUTES BEFORE MORNING MEAL CAPSULE, DELAYED RELEASE ORAL ONCE A DAY
Status: ACTIVE | COMMUNITY
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
## 2024-10-21 LAB
CALPROTECTIN, FECAL: 537
CLOSTRIDIUM DIFFICILE TOXINB,QL REAL TIME PCR: NOT DETECTED
LACTOFERRIN, FECAL, QUANT.: 98.3

## 2024-10-29 ENCOUNTER — LAB OUTSIDE AN ENCOUNTER (OUTPATIENT)
Dept: URBAN - METROPOLITAN AREA CLINIC 68 | Facility: CLINIC | Age: 49
End: 2024-10-29

## 2024-10-29 ENCOUNTER — OFFICE VISIT (OUTPATIENT)
Dept: URBAN - METROPOLITAN AREA CLINIC 68 | Facility: CLINIC | Age: 49
End: 2024-10-29

## 2024-10-29 VITALS
TEMPERATURE: 98.2 F | HEIGHT: 68 IN | HEART RATE: 67 BPM | DIASTOLIC BLOOD PRESSURE: 80 MMHG | OXYGEN SATURATION: 98 % | SYSTOLIC BLOOD PRESSURE: 130 MMHG | BODY MASS INDEX: 25.01 KG/M2 | WEIGHT: 165 LBS

## 2024-10-29 RX ORDER — OMEPRAZOLE 20 MG/1
1 CAPSULE 30 MINUTES BEFORE MORNING MEAL CAPSULE, DELAYED RELEASE ORAL ONCE A DAY
Status: ACTIVE | COMMUNITY

## 2024-10-29 RX ORDER — PREDNISONE 10 MG/1
2 TABLETS TABLET ORAL ONCE A DAY
Qty: 60 TABLET | Refills: 0 | OUTPATIENT
Start: 2024-10-14 | End: 2024-11-27

## 2024-10-29 RX ORDER — PREDNISONE 10 MG/1
3 TABLETS TABLET ORAL ONCE A DAY
Qty: 90 TABLET | Refills: 0 | Status: ACTIVE | COMMUNITY
Start: 2024-10-14 | End: 2024-11-12

## 2024-10-29 RX ORDER — VEDOLIZUMAB 300 MG/5ML
AS DIRECTED INJECTION, POWDER, LYOPHILIZED, FOR SOLUTION INTRAVENOUS
OUTPATIENT
Start: 2024-10-29

## 2024-10-29 RX ORDER — INSULIN LISPRO-AABC 100 [IU]/ML
AS DIRECTED INJECTION, SOLUTION INTRAVENOUS; SUBCUTANEOUS
Status: ACTIVE | COMMUNITY
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
## 2024-11-05 ENCOUNTER — WEB ENCOUNTER (OUTPATIENT)
Dept: URBAN - METROPOLITAN AREA CLINIC 68 | Facility: CLINIC | Age: 49
End: 2024-11-05

## 2024-11-06 ENCOUNTER — TELEPHONE ENCOUNTER (OUTPATIENT)
Dept: URBAN - METROPOLITAN AREA CLINIC 68 | Facility: CLINIC | Age: 49
End: 2024-11-06

## 2024-11-08 ENCOUNTER — OFFICE VISIT (OUTPATIENT)
Dept: URBAN - METROPOLITAN AREA SURGERY CENTER 12 | Facility: SURGERY CENTER | Age: 49
End: 2024-11-08

## 2024-11-14 ENCOUNTER — WEB ENCOUNTER (OUTPATIENT)
Dept: URBAN - METROPOLITAN AREA CLINIC 68 | Facility: CLINIC | Age: 49
End: 2024-11-14

## 2024-11-14 RX ORDER — OMEPRAZOLE 20 MG/1
1 CAPSULE CAPSULE, DELAYED RELEASE ORAL ONCE A DAY
Qty: 30 CAPSULE | Refills: 11

## 2024-11-14 RX ORDER — PREDNISONE 10 MG/1
2 TABLETS TABLET ORAL ONCE A DAY
Qty: 60 TABLET | Refills: 0
Start: 2024-10-14 | End: 2024-12-18

## 2024-12-10 ENCOUNTER — OFFICE VISIT (OUTPATIENT)
Dept: URBAN - METROPOLITAN AREA SURGERY CENTER 12 | Facility: SURGERY CENTER | Age: 49
End: 2024-12-10

## 2025-03-10 ENCOUNTER — P2P PATIENT RECORD (OUTPATIENT)
Age: 50
End: 2025-03-10

## 2025-06-18 ENCOUNTER — P2P PATIENT RECORD (OUTPATIENT)
Age: 50
End: 2025-06-18

## 2025-08-06 ENCOUNTER — APPOINTMENT (OUTPATIENT)
Dept: URBAN - METROPOLITAN AREA CLINIC 328 | Facility: CLINIC | Age: 50
Setting detail: DERMATOLOGY
End: 2025-08-06

## 2025-08-06 DIAGNOSIS — L81.4 OTHER MELANIN HYPERPIGMENTATION: ICD-10-CM | Status: STABLE

## 2025-08-06 DIAGNOSIS — L82.1 OTHER SEBORRHEIC KERATOSIS: ICD-10-CM | Status: STABLE

## 2025-08-06 DIAGNOSIS — L40.0 PSORIASIS VULGARIS: ICD-10-CM | Status: INADEQUATELY CONTROLLED

## 2025-08-06 DIAGNOSIS — D18.0 HEMANGIOMA: ICD-10-CM | Status: STABLE

## 2025-08-06 DIAGNOSIS — D22 MELANOCYTIC NEVI: ICD-10-CM | Status: STABLE

## 2025-08-06 PROBLEM — D22.5 MELANOCYTIC NEVI OF TRUNK: Status: ACTIVE | Noted: 2025-08-06

## 2025-08-06 PROBLEM — D18.01 HEMANGIOMA OF SKIN AND SUBCUTANEOUS TISSUE: Status: ACTIVE | Noted: 2025-08-06

## 2025-08-06 PROCEDURE — ? TREATMENT REGIMEN

## 2025-08-06 PROCEDURE — ? FULL BODY SKIN EXAM

## 2025-08-06 PROCEDURE — ? PRESCRIPTION MEDICATION MANAGEMENT

## 2025-08-06 PROCEDURE — ? PRESCRIPTION

## 2025-08-06 PROCEDURE — ? BROAD BAND UVB ORDER

## 2025-08-06 PROCEDURE — ? COUNSELING

## 2025-08-06 RX ORDER — CLOBETASOL PROPIONATE CREAM USP, 0.05% 0.5 MG/G
CREAM TOPICAL BID
Qty: 240 | Refills: 2 | Status: ERX | COMMUNITY
Start: 2025-08-06

## 2025-08-06 RX ORDER — HYDROCORTISONE 25 MG/G
CREAM TOPICAL
Qty: 30 | Refills: 0 | Status: ERX | COMMUNITY
Start: 2025-08-06

## 2025-08-06 RX ORDER — FLUOCINONIDE 0.5 MG/ML
SOLUTION TOPICAL QD
Qty: 60 | Refills: 5 | Status: ERX | COMMUNITY
Start: 2025-08-06

## 2025-08-06 RX ORDER — CALCIPOTRIENE 50 UG/G
CREAM TOPICAL QD
Qty: 120 | Refills: 3 | Status: ERX | COMMUNITY
Start: 2025-08-06

## 2025-08-06 RX ADMIN — FLUOCINONIDE 1: 0.5 SOLUTION TOPICAL at 00:00

## 2025-08-06 RX ADMIN — CLOBETASOL PROPIONATE CREAM USP, 0.05% 1: 0.5 CREAM TOPICAL at 00:00

## 2025-08-06 RX ADMIN — HYDROCORTISONE 1: 25 CREAM TOPICAL at 00:00

## 2025-08-06 RX ADMIN — CALCIPOTRIENE 1: 50 CREAM TOPICAL at 00:00

## 2025-08-06 ASSESSMENT — LOCATION DETAILED DESCRIPTION DERM
LOCATION DETAILED: RIGHT SUPERIOR MEDIAL MIDBACK
LOCATION DETAILED: LEFT MID-UPPER BACK
LOCATION DETAILED: LEFT ANTERIOR SHOULDER
LOCATION DETAILED: LEFT PROXIMAL PRETIBIAL REGION
LOCATION DETAILED: RIGHT PROXIMAL PRETIBIAL REGION
LOCATION DETAILED: EPIGASTRIC SKIN

## 2025-08-06 ASSESSMENT — LOCATION SIMPLE DESCRIPTION DERM
LOCATION SIMPLE: ABDOMEN
LOCATION SIMPLE: LEFT SHOULDER
LOCATION SIMPLE: RIGHT LOWER BACK
LOCATION SIMPLE: LEFT PRETIBIAL REGION
LOCATION SIMPLE: LEFT UPPER BACK
LOCATION SIMPLE: RIGHT PRETIBIAL REGION

## 2025-08-06 ASSESSMENT — PGA PSORIASIS: PGA PSORIASIS 2020: MODERATE

## 2025-08-06 ASSESSMENT — BSA PSORIASIS: % BODY COVERED IN PSORIASIS: 28

## 2025-08-06 ASSESSMENT — LOCATION ZONE DERM
LOCATION ZONE: TRUNK
LOCATION ZONE: LEG
LOCATION ZONE: ARM

## 2025-08-13 ENCOUNTER — APPOINTMENT (OUTPATIENT)
Dept: URBAN - METROPOLITAN AREA CLINIC 334 | Facility: CLINIC | Age: 50
Setting detail: DERMATOLOGY
End: 2025-08-13

## 2025-08-13 DIAGNOSIS — L40.0 PSORIASIS VULGARIS: ICD-10-CM

## 2025-08-13 PROCEDURE — ? PHOTOTHERAPY TREATMENT

## 2025-08-15 ENCOUNTER — APPOINTMENT (OUTPATIENT)
Dept: URBAN - METROPOLITAN AREA CLINIC 334 | Facility: CLINIC | Age: 50
Setting detail: DERMATOLOGY
End: 2025-08-15

## 2025-08-15 DIAGNOSIS — L40.0 PSORIASIS VULGARIS: ICD-10-CM

## 2025-08-15 PROCEDURE — ? PHOTOTHERAPY TREATMENT

## 2025-08-18 ENCOUNTER — APPOINTMENT (OUTPATIENT)
Dept: URBAN - METROPOLITAN AREA CLINIC 334 | Facility: CLINIC | Age: 50
Setting detail: DERMATOLOGY
End: 2025-08-18

## 2025-08-18 DIAGNOSIS — L40.0 PSORIASIS VULGARIS: ICD-10-CM

## 2025-08-18 PROCEDURE — ? PHOTOTHERAPY TREATMENT
